# Patient Record
Sex: FEMALE | Race: WHITE | NOT HISPANIC OR LATINO | URBAN - METROPOLITAN AREA
[De-identification: names, ages, dates, MRNs, and addresses within clinical notes are randomized per-mention and may not be internally consistent; named-entity substitution may affect disease eponyms.]

---

## 2024-05-01 ENCOUNTER — HOSPITAL ENCOUNTER (OUTPATIENT)
Dept: RADIOLOGY | Facility: EXTERNAL LOCATION | Age: 24
Discharge: HOME | End: 2024-05-01

## 2024-05-01 DIAGNOSIS — M79.642 LEFT HAND PAIN: ICD-10-CM

## 2024-09-24 ENCOUNTER — HOSPITAL ENCOUNTER (OUTPATIENT)
Dept: RADIOLOGY | Facility: CLINIC | Age: 24
Discharge: HOME | End: 2024-09-24
Payer: COMMERCIAL

## 2024-09-24 DIAGNOSIS — N92.1 EXCESSIVE AND FREQUENT MENSTRUATION WITH IRREGULAR CYCLE: ICD-10-CM

## 2024-09-24 DIAGNOSIS — R10.2 PELVIC AND PERINEAL PAIN: ICD-10-CM

## 2024-09-24 PROCEDURE — 76856 US EXAM PELVIC COMPLETE: CPT

## 2024-11-18 ENCOUNTER — OFFICE VISIT (OUTPATIENT)
Dept: URGENT CARE | Age: 24
End: 2024-11-18
Payer: COMMERCIAL

## 2024-11-18 VITALS
HEART RATE: 109 BPM | SYSTOLIC BLOOD PRESSURE: 122 MMHG | RESPIRATION RATE: 18 BRPM | OXYGEN SATURATION: 98 % | TEMPERATURE: 97 F | DIASTOLIC BLOOD PRESSURE: 82 MMHG

## 2024-11-18 DIAGNOSIS — K52.9 GASTROENTERITIS: Primary | ICD-10-CM

## 2024-11-18 DIAGNOSIS — R10.33 PERIUMBILICAL ABDOMINAL PAIN: ICD-10-CM

## 2024-11-18 LAB
POC APPEARANCE, URINE: CLEAR
POC BILIRUBIN, URINE: NEGATIVE
POC BLOOD, URINE: NEGATIVE
POC COLOR, URINE: YELLOW
POC GLUCOSE, URINE: NEGATIVE MG/DL
POC KETONES, URINE: NEGATIVE MG/DL
POC LEUKOCYTES, URINE: NEGATIVE
POC NITRITE,URINE: NEGATIVE
POC PH, URINE: 6 PH
POC PROTEIN, URINE: NEGATIVE MG/DL
POC SPECIFIC GRAVITY, URINE: 1.01
POC UROBILINOGEN, URINE: 0.2 EU/DL

## 2024-11-18 ASSESSMENT — ENCOUNTER SYMPTOMS
ABDOMINAL PAIN: 1
DIARRHEA: 1
CONSTITUTIONAL NEGATIVE: 1
VOMITING: 1

## 2024-11-18 NOTE — PROGRESS NOTES
Subjective   Patient ID: Maryann Link is a 24 y.o. female. They present today with a chief complaint of Vomiting and Diarrhea (Started 3am, epigastric cramping prior to vomiting/diarrhea ).    History of Present Illness  24-year-old female presents to clinic today with complaints of vomiting, diarrhea and upper abdominal cramping.  Patient states has been ongoing since 3 AM.  She states she is having trouble keeping food and liquid down.  She denies any fever.  Denies any blood or mucus in the stool.  Denies recent long travel.  Denies any recent change in diet.  Denies any change in medications.  Denies any recent antibiotic usage.  Denies urinary symptoms but states that she has had a kidney bladder infection in the past without urinary symptoms.  She has not taken anything for symptoms.      Vomiting  Associated symptoms: abdominal pain and diarrhea    Diarrhea  Associated symptoms: abdominal pain and vomiting        Past Medical History  Allergies as of 11/18/2024    (No Known Allergies)       (Not in a hospital admission)       No past medical history on file.    Past Surgical History:   Procedure Laterality Date    CT ANGIO NECK W AND WO IV CONTRAST  9/9/2023    CT NECK ANGIO W AND WO IV CONTRAST 9/9/2023 P[facility] CT            Review of Systems  Review of Systems   Constitutional: Negative.    Gastrointestinal:  Positive for abdominal pain, diarrhea and vomiting.   Genitourinary: Negative.                                   Objective    Vitals:    11/18/24 0824   BP: 122/82   Pulse: 109   Resp: 18   Temp: 36.1 °C (97 °F)   TempSrc: Oral   SpO2: 98%     No LMP recorded.    Physical Exam  Constitutional:       General: She is not in acute distress.     Appearance: Normal appearance. She is not ill-appearing.   Abdominal:      Tenderness: There is abdominal tenderness in the epigastric area, periumbilical area and suprapubic area.   Skin:     General: Skin is warm.      Capillary Refill: Capillary refill takes  less than 2 seconds.   Neurological:      Mental Status: She is alert.         Procedures    Point of Care Test & Imaging Results from this visit  No results found for this visit on 11/18/24.   No results found.    Diagnostic study results (if any) were reviewed by Dank Contreras PA-C.    Assessment/Plan   Allergies, medications, history, and pertinent labs/EKGs/Imaging reviewed by Dank Contreras PA-C.     Medical Decision Making  Patient alert and oriented, in no acute distress.  Pleasant and cooperative on examination.    There is minor discomfort to the epigastric, periumbilical and suprapubic region.  No abnormal bowel sounds noted.  No masses noted.  Urinalysis was completed and did not show any signs of an acute UTI.  Patient denies concern for pregnancy.  Based on physical examination and history this seems like possible food intolerance versus viral gastroenteritis.  Patient has Zofran at home and I advised her that she may take it.  I also advised her on the brat diet.  Monitor for worsening signs or no improvement and if this happens please go to the emergency room.  Patient is agreement with plan.    Orders and Diagnoses  There are no diagnoses linked to this encounter.    Medical Admin Record      Patient disposition: Home    Electronically signed by Dank Contreras PA-C  8:39 AM

## 2024-11-19 ENCOUNTER — OFFICE VISIT (OUTPATIENT)
Dept: URGENT CARE | Age: 24
End: 2024-11-19
Payer: COMMERCIAL

## 2024-11-19 VITALS
HEART RATE: 95 BPM | SYSTOLIC BLOOD PRESSURE: 119 MMHG | TEMPERATURE: 98.5 F | RESPIRATION RATE: 16 BRPM | DIASTOLIC BLOOD PRESSURE: 88 MMHG | OXYGEN SATURATION: 98 %

## 2024-11-19 DIAGNOSIS — R51.9 NONINTRACTABLE HEADACHE, UNSPECIFIED CHRONICITY PATTERN, UNSPECIFIED HEADACHE TYPE: ICD-10-CM

## 2024-11-19 DIAGNOSIS — K52.9 GASTROENTERITIS: ICD-10-CM

## 2024-11-19 DIAGNOSIS — H92.02 OTALGIA, LEFT: Primary | ICD-10-CM

## 2024-11-19 PROCEDURE — 99213 OFFICE O/P EST LOW 20 MIN: CPT | Performed by: PHYSICIAN ASSISTANT

## 2024-11-19 ASSESSMENT — ENCOUNTER SYMPTOMS
BLOOD IN STOOL: 0
CHILLS: 1
DIARRHEA: 1
RHINORRHEA: 0
NAUSEA: 1
VOMITING: 1
ABDOMINAL PAIN: 0
FEVER: 0
DIAPHORESIS: 1
SORE THROAT: 0
CONSTIPATION: 0
SHORTNESS OF BREATH: 0
DYSURIA: 0
COUGH: 0

## 2024-11-20 NOTE — PROGRESS NOTES
Subjective   Patient ID: Maryann Link is a 24 y.o. female. They present today with a chief complaint of Earache and Headache.    History of Present Illness  Patient presents today for evaluation for possible ear infection. She states that her left ear started to hurt this morning and she is prone to ear infections. She was seen here yesterday for viral gastroenteritis with vomiting and diarrhea. She states that this has been resolving. She has been able to tolerate food (soup and rice) and fluids (water, Pedialyte and Sprite) without emesis. She has had a headache, though this has improved slightly. She denies any blood in emesis or stool. She denies any runny nose, congestion, or throat or cough. She states she has had some sweats and chills. She denies any ear drainage. She admits her coworker was sick with similar symptoms. She states she is currently on her menses.          Past Medical History  Allergies as of 11/19/2024    (No Known Allergies)       (Not in a hospital admission)       No past medical history on file.    Past Surgical History:   Procedure Laterality Date    CT ANGIO NECK  9/9/2023    CT NECK ANGIO W AND WO IV CONTRAST 9/9/2023 [facility] CT            Review of Systems  Review of Systems   Constitutional:  Positive for chills and diaphoresis. Negative for fever.   HENT:  Positive for ear pain. Negative for congestion, ear discharge, rhinorrhea and sore throat.    Respiratory:  Negative for cough and shortness of breath.    Gastrointestinal:  Positive for diarrhea, nausea and vomiting. Negative for abdominal pain, blood in stool and constipation.   Genitourinary:  Negative for dysuria.                                  Objective    Vitals:    11/19/24 1943   BP: 119/88   Pulse: 95   Resp: 16   Temp: 36.9 °C (98.5 °F)   SpO2: 98%     No LMP recorded.    Physical Exam  Vitals and nursing note reviewed.   Constitutional:       General: She is not in acute distress.     Appearance: Normal appearance.    HENT:      Head: Normocephalic.      Right Ear: Tympanic membrane and ear canal normal.      Left Ear: Tympanic membrane and ear canal normal.      Nose: Congestion present. No rhinorrhea.      Mouth/Throat:      Mouth: Mucous membranes are moist.      Pharynx: No oropharyngeal exudate.   Cardiovascular:      Rate and Rhythm: Normal rate and regular rhythm.      Heart sounds: Normal heart sounds.   Pulmonary:      Effort: Pulmonary effort is normal. No respiratory distress.      Breath sounds: Normal breath sounds. No wheezing.   Abdominal:      General: Abdomen is flat. Bowel sounds are normal.      Palpations: Abdomen is soft.      Tenderness: There is no abdominal tenderness.   Lymphadenopathy:      Cervical: No cervical adenopathy.   Neurological:      Mental Status: She is alert and oriented to person, place, and time.      Cranial Nerves: Cranial nerves 2-12 are intact.         Procedures    Point of Care Test & Imaging Results from this visit  No results found for this visit on 11/19/24.   No results found.    Diagnostic study results (if any) were reviewed by Damaris Link PA-C.    Assessment/Plan   Allergies, medications, history, and pertinent labs/EKGs/Imaging reviewed by Damaris Link PA-C.     Medical Decision Making  Patient presents for left ear pain, though was reassured there is no signs of otitis media on examination. We discussed the pain is likely from underlying nasal congestion blocking the eustachian tube. She was recommended to try Flonase to help with eustachian tube drainage and Tylenol for pain.  In regards to her viral gastroenteritis, she reports no vomiting in ~8 hours and tolerating foods/fluids. She was encouraged to continue with oral hydration to help her headache. We discussed ER precautions with any acute worsening.    Orders and Diagnoses  Diagnoses and all orders for this visit:  Otalgia, left  Gastroenteritis  Nonintractable headache, unspecified chronicity pattern,  unspecified headache type      Medical Admin Record      Patient disposition: Home    Electronically signed by Damaris Link PA-C  7:55 PM

## 2025-03-07 ENCOUNTER — OFFICE VISIT (OUTPATIENT)
Dept: URGENT CARE | Age: 25
End: 2025-03-07
Payer: COMMERCIAL

## 2025-03-07 VITALS
SYSTOLIC BLOOD PRESSURE: 114 MMHG | OXYGEN SATURATION: 98 % | DIASTOLIC BLOOD PRESSURE: 85 MMHG | HEART RATE: 92 BPM | TEMPERATURE: 98 F

## 2025-03-07 DIAGNOSIS — J01.90 ACUTE SINUSITIS, RECURRENCE NOT SPECIFIED, UNSPECIFIED LOCATION: Primary | ICD-10-CM

## 2025-03-07 DIAGNOSIS — R05.1 ACUTE COUGH: ICD-10-CM

## 2025-03-07 RX ORDER — BROMPHENIRAMINE MALEATE, PSEUDOEPHEDRINE HYDROCHLORIDE, AND DEXTROMETHORPHAN HYDROBROMIDE 2; 30; 10 MG/5ML; MG/5ML; MG/5ML
10 SYRUP ORAL 4 TIMES DAILY PRN
Qty: 240 ML | Refills: 0 | Status: SHIPPED | OUTPATIENT
Start: 2025-03-07 | End: 2025-03-17

## 2025-03-07 RX ORDER — DOXYCYCLINE HYCLATE 100 MG
100 TABLET ORAL 2 TIMES DAILY
Qty: 20 TABLET | Refills: 0 | Status: SHIPPED | OUTPATIENT
Start: 2025-03-07 | End: 2025-03-17

## 2025-03-07 ASSESSMENT — ENCOUNTER SYMPTOMS
CHILLS: 0
WHEEZING: 0
SHORTNESS OF BREATH: 0
HEMOPTYSIS: 0
RHINORRHEA: 1
COUGH: 1
SORE THROAT: 0
FEVER: 0
MYALGIAS: 0
SWEATS: 0
WEIGHT LOSS: 0
HEARTBURN: 0
HEADACHES: 1

## 2025-03-07 NOTE — PROGRESS NOTES
Subjective   Patient ID: Maryann Link is a 24 y.o. female. They present today with a chief complaint of Headache (C/o nasal/chest congestion).    History of Present Illness    History provided by:  Patient   used: No    Cough  This is a new problem. The current episode started 1 to 4 weeks ago (2 weeks). The problem has been gradually worsening. The problem occurs hourly. The cough is Productive of purulent sputum. Associated symptoms include headaches, nasal congestion and rhinorrhea. Pertinent negatives include no chest pain, chills, ear congestion, ear pain, fever, heartburn, hemoptysis, myalgias, postnasal drip, rash, sore throat, shortness of breath, sweats, weight loss or wheezing. The symptoms are aggravated by lying down. Treatments tried: Mucinex, albuterol inhaler, Tessalon Perle. The treatment provided mild relief.       Past Medical History  Allergies as of 03/07/2025    (No Known Allergies)       (Not in a hospital admission)       No past medical history on file.    Past Surgical History:   Procedure Laterality Date    CT ANGIO NECK  9/9/2023    CT NECK ANGIO W AND WO IV CONTRAST 9/9/2023 [facility] CT     Review of Systems  Review of Systems   Constitutional:  Negative for chills, fever and weight loss.   HENT:  Positive for rhinorrhea. Negative for ear pain, postnasal drip and sore throat.    Respiratory:  Positive for cough. Negative for hemoptysis, shortness of breath and wheezing.    Cardiovascular:  Negative for chest pain.   Gastrointestinal:  Negative for heartburn.   Musculoskeletal:  Negative for myalgias.   Skin:  Negative for rash.   Neurological:  Positive for headaches.          Objective    Vitals:    03/07/25 0809   BP: 114/85   Pulse: 92   Temp: 36.7 °C (98 °F)   SpO2: 98%     No LMP recorded.    Physical Exam  Vitals and nursing note reviewed.   Constitutional:       Appearance: Normal appearance.   HENT:      Head: Normocephalic and atraumatic.      Right Ear:  Hearing, tympanic membrane, ear canal and external ear normal.      Left Ear: Hearing, tympanic membrane, ear canal and external ear normal.      Nose: Mucosal edema and congestion present. No nasal deformity, septal deviation, signs of injury, laceration, nasal tenderness or rhinorrhea.      Right Sinus: No maxillary sinus tenderness or frontal sinus tenderness.      Left Sinus: No maxillary sinus tenderness or frontal sinus tenderness.      Mouth/Throat:      Lips: Pink.      Mouth: Mucous membranes are moist.      Pharynx: Oropharynx is clear. Uvula midline.      Tonsils: No tonsillar exudate or tonsillar abscesses.   Cardiovascular:      Rate and Rhythm: Normal rate and regular rhythm.      Heart sounds: Normal heart sounds.   Pulmonary:      Effort: Pulmonary effort is normal.      Breath sounds: Normal breath sounds and air entry.   Musculoskeletal:      Cervical back: Normal range of motion and neck supple.   Lymphadenopathy:      Cervical: No cervical adenopathy.   Neurological:      Mental Status: She is alert.   Psychiatric:         Mood and Affect: Mood normal.         Behavior: Behavior normal.         Procedures    Point of Care Test & Imaging Results from this visit  No results found for this visit on 03/07/25.   No results found.    Diagnostic study results (if any) were reviewed by JAYANT Ellis.    Assessment/Plan   Allergies, medications, history, and pertinent labs/EKGs/Imaging reviewed by JAYANT Ellis.     Medical Decision Making  Change your toothbrush in 24 hours.  The antibiotic was prescribed due to severe symptoms.  Take the antibiotic with food.  Eat yogurt or take probiotic once a day.  Symptoms should improve in 2 to 3 days.   Take Bromfed as needed for cough, nasal congestion, and/or allergies.  Wash your hands often, especially after coughing or touching your nose or mouth.  Use disposable tissues instead of handkerchiefs.  Increase fluid intake and rest as  needed.  Take Tylenol and/or ibuprofen as needed for aches and pain and/or fever.  Return or follow-up with primary care provider if symptoms did not improve.  Call 911 or go to the nearest emergency room if symptoms became severe such as fever of 102.5 degrees Fahrenheit or 39.2 degrees Celsius, severe pain, shortness of breath, chest tightness.   Patient verbalized understanding of the instructions and left in stable condition.     Orders and Diagnoses  Diagnoses and all orders for this visit:  Acute sinusitis, recurrence not specified, unspecified location  -     doxycycline (Vibra-Tabs) 100 mg tablet; Take 1 tablet (100 mg) by mouth 2 times a day for 10 days. Take with a full glass of water and do not lie down for at least 30 minutes after.  -     brompheniramine-pseudoeph-DM 2-30-10 mg/5 mL syrup; Take 10 mL by mouth 4 times a day as needed for cough, allergies or congestion for up to 10 days.  Acute cough  -     brompheniramine-pseudoeph-DM 2-30-10 mg/5 mL syrup; Take 10 mL by mouth 4 times a day as needed for cough, allergies or congestion for up to 10 days.      Medical Admin Record      Patient disposition: Home    Electronically signed by JAYANT Ellis  8:22 AM